# Patient Record
(demographics unavailable — no encounter records)

---

## 2024-11-07 NOTE — HISTORY OF PRESENT ILLNESS
[FreeTextEntry1] : 1 month f/u [de-identified] : Patient is following up on ADHD and weight management with medication management. Patient noticed that upon stopping GLP-1 therapy for 1 month, she regained weight about 10 pounds.

## 2024-11-07 NOTE — HISTORY OF PRESENT ILLNESS
[FreeTextEntry1] : 1 month f/u [de-identified] : Patient is following up on ADHD and weight management with medication management. Patient noticed that upon stopping GLP-1 therapy for 1 month, she regained weight about 10 pounds.

## 2024-11-07 NOTE — PLAN
[FreeTextEntry1] : - Resume Wegovy- may experience nausea due to gap. Call for lower dose if nausea occurs. - Renew Adderall - Routine monthly follow up

## 2024-12-05 NOTE — PHYSICAL EXAM
[de-identified] : Unable to conduct physical examination due to Telehealth encounter. Patient appeared alert and stable on camera.

## 2024-12-05 NOTE — HISTORY OF PRESENT ILLNESS
[Home] : at home, [unfilled] , at the time of the visit. [Medical Office: ([facility])___] : at the medical office located in  [Verbal consent obtained from patient] : the patient, [unfilled] [de-identified] : Patient would like renewal of Adderall. She has no acute complaints.  [FreeTextEntry1] : medication renewal

## 2024-12-05 NOTE — PHYSICAL EXAM
[de-identified] : Unable to conduct physical examination due to Telehealth encounter. Patient appeared alert and stable on camera.

## 2024-12-05 NOTE — HISTORY OF PRESENT ILLNESS
[Home] : at home, [unfilled] , at the time of the visit. [Medical Office: ([facility])___] : at the medical office located in  [Verbal consent obtained from patient] : the patient, [unfilled] [FreeTextEntry1] : medication renewal [de-identified] : Patient would like renewal of Adderall. She has no acute complaints.

## 2025-01-30 NOTE — PHYSICAL EXAM
[Normal] : normal rate, regular rhythm, normal S1 and S2 and no murmur heard [No Joint Swelling] : no joint swelling [Grossly Normal Strength/Tone] : grossly normal strength/tone [Coordination Grossly Intact] : coordination grossly intact [No Focal Deficits] : no focal deficits [Normal Gait] : normal gait [Normal Affect] : the affect was normal [Normal Insight/Judgement] : insight and judgment were intact [de-identified] : Left TM erythema

## 2025-01-30 NOTE — HISTORY OF PRESENT ILLNESS
[FreeTextEntry1] : Left ear pain, feeling unwell, needs medication renewal for ADHD and medical weight management. [de-identified] : Patient presents for routine medication renewal but unfortunately has been experiencing increased stress and due to personal circumstances. Additionally, the patient has been experiencing discomfort in the left ear and was unable to sleep due to this. She reported pain extending into her teeth on the left aspect of her face. Her BP was noted to be elevated on initial evaluation today. Patient is currently on slow taper of GLP-1 medication but had some GI side effects from the higher dose so would like to decrease dose. She is not yet at place to discontinue the medication as there have been some increases in the weight. We discussed a weight maintenance plan to prevent weight regain.

## 2025-01-30 NOTE — HISTORY OF PRESENT ILLNESS
[FreeTextEntry1] : Left ear pain, feeling unwell, needs medication renewal for ADHD and medical weight management. [de-identified] : Patient presents for routine medication renewal but unfortunately has been experiencing increased stress and due to personal circumstances. Additionally, the patient has been experiencing discomfort in the left ear and was unable to sleep due to this. She reported pain extending into her teeth on the left aspect of her face. Her BP was noted to be elevated on initial evaluation today. Patient is currently on slow taper of GLP-1 medication but had some GI side effects from the higher dose so would like to decrease dose. She is not yet at place to discontinue the medication as there have been some increases in the weight. We discussed a weight maintenance plan to prevent weight regain.

## 2025-01-30 NOTE — PLAN
[FreeTextEntry1] : Treat otitis media with oral antibiotics. Patient advised to take probiotics to help mitigate potential gastrointestinal side effects of oral antibiotics. Decrease Wegovy dose to 0.5mg weekly to help maintain weight while limiting potential GI side effects. Goal is to eventually wean off, but we will do this slowly to allow patient's body to adjust accordingly. Renew Adderall for management of ADHD.

## 2025-01-30 NOTE — PHYSICAL EXAM
[Normal] : normal rate, regular rhythm, normal S1 and S2 and no murmur heard [No Joint Swelling] : no joint swelling [Grossly Normal Strength/Tone] : grossly normal strength/tone [Coordination Grossly Intact] : coordination grossly intact [No Focal Deficits] : no focal deficits [Normal Gait] : normal gait [Normal Affect] : the affect was normal [Normal Insight/Judgement] : insight and judgment were intact [de-identified] : Left TM erythema

## 2025-03-02 NOTE — HISTORY OF PRESENT ILLNESS
[FreeTextEntry1] : Medication renewal [de-identified] : Renewal of medication for management of obesity and ADHD. She feels well and has no acute complaints.

## 2025-03-02 NOTE — PLAN
[FreeTextEntry1] : BP initially elevated as patient was driving here in haste to prevent being late.  Renew medications. Blood test ordered to be done prior to next visit.

## 2025-03-02 NOTE — HISTORY OF PRESENT ILLNESS
[FreeTextEntry1] : Medication renewal [de-identified] : Renewal of medication for management of obesity and ADHD. She feels well and has no acute complaints.

## 2025-03-27 NOTE — PLAN
[FreeTextEntry1] : Continue with current medications as written. Patient was allowed to ask questions. All questions, if any, were addressed appropriately with patient expressing understanding of any plan of care made.

## 2025-03-27 NOTE — HISTORY OF PRESENT ILLNESS
[Home] : at home, [unfilled] , at the time of the visit. [Medical Office: ([facility])___] : at the medical office located in  [Telehealth (audio & video)] : This visit was provided via telehealth using real-time 2-way audio visual technology. [Verbal consent obtained from patient] : the patient, [unfilled] [FreeTextEntry1] : Medication renewal for ADHD and Weight management [de-identified] : Patient would like medication renewal. She reports doing well, would like to lose a few more pounds but weight is about same as it was when she was last seen in office.

## 2025-03-27 NOTE — PHYSICAL EXAM
[de-identified] : Unable to conduct physical examination due to Telehealth encounter. Patient appeared alert and stable on camera.

## 2025-04-14 NOTE — HISTORY OF PRESENT ILLNESS
[FreeTextEntry1] : 59 yop2 s 31, d 29- has gs and gd, 5 2 all a &w.  was a [facility] patient.  meds- adderall, chol statin surg- cs, , l hip replacement, ankle fx during pregnancy.  nkda  fam hx- nc m-  83, jamshid causes. f a &w.  denies cvb, dc, pain, bladder prob denies dyspareunia.  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

## 2025-05-08 NOTE — HISTORY OF PRESENT ILLNESS
[Home] : at home, [unfilled] , at the time of the visit. [Medical Office: ([facility])___] : at the medical office located in  [Telephone (audio)] : This telephonic visit was provided via audio only technology. [Technical] : patient unable to effectively utilize tele-video due to technical issues. [Verbal consent obtained from patient] : the patient, [unfilled] [FreeTextEntry1] : renewal medication [de-identified] : Patient would like to renew medication for ADHD and weight maintenance. She is going through some stress with granddaughter needed emergency eye surgery due to detached retina. She is otherwise doing well in terms of general health.

## 2025-05-08 NOTE — PHYSICAL EXAM
[de-identified] : Unable to conduct physical examination due to Telehealth encounter. Patient appeared alert and stable on camera.

## 2025-05-08 NOTE — PLAN
[FreeTextEntry1] : Patient was allowed to ask questions. All questions, if any, were addressed appropriately with patient expressing understanding of any plan of care made.

## 2025-06-12 NOTE — HISTORY OF PRESENT ILLNESS
[Home] : at home, [unfilled] , at the time of the visit. [Medical Office: ([facility])___] : at the medical office located in  [Telephone (audio)] : This telephonic visit was provided via audio only technology. [Technical] : patient unable to effectively utilize tele-video due to technical issues. [Verbal consent obtained from patient] : the patient, [unfilled] [FreeTextEntry1] : Medication renewal [de-identified] : Patient reports she is feeling well and has no acute complaints.